# Patient Record
Sex: MALE | Race: WHITE | Employment: OTHER | URBAN - METROPOLITAN AREA
[De-identification: names, ages, dates, MRNs, and addresses within clinical notes are randomized per-mention and may not be internally consistent; named-entity substitution may affect disease eponyms.]

---

## 2018-01-10 ENCOUNTER — HOSPITAL ENCOUNTER (OUTPATIENT)
Dept: NEUROLOGY | Age: 73
Discharge: HOME | End: 2018-01-10
Attending: PSYCHIATRY & NEUROLOGY
Payer: MEDICARE

## 2018-01-10 DIAGNOSIS — H81.392 VESTIBULAR VERTIGO, LEFT: ICD-10-CM

## 2018-01-10 PROCEDURE — 92585 HC AUDITORY EVOKE POTENT COMPR: CPT

## 2018-01-10 NOTE — PROCEDURES
[address] [address]  MR#: [id number]  : 1945  ACCOUNT #: [de-identified]   DATE OF SERVICE: 01/10/2018    ELECTROENCEPHALOGRAM NUMBER:  [id number]. CLINICAL INFORMATION:  Vestibular vertigo on the left. DESCRIPTION:  Well-formed reproducible waves were obtained to click stimulation using 90 dB ipsilateral stimulation with 60 dB contralateral masking. With right ear stimulation, wave 3 was at 3.8 milliseconds and wave 5 was 5.9 milliseconds. With left ear stimulation, wave 3 was at 3.7 milliseconds and wave 5 was 5.7 milliseconds. The left/right difference in the 3/5 interval was 0.1 milliseconds, normal.    IMPRESSION:  Although wave 1 was absent bilaterally, wave 3 and wave 5 clearly are present and are normal.  These are normal and symmetric brainstem auditory evoked responses bilaterally.       Mary Love MD       [facility] / Mati Hutchins  D: 01/10/2018 17:12     T: 01/10/2018 18:50  JOB #: [id number]

## 2018-07-27 PROBLEM — L03.116 LEFT LEG CELLULITIS: Status: ACTIVE | Noted: 2018-07-27

## 2018-12-12 PROBLEM — R07.9 ACUTE CHEST PAIN: Status: ACTIVE | Noted: 2018-12-12

## 2019-03-25 ENCOUNTER — ANESTHESIA EVENT (OUTPATIENT)
Dept: SURGERY | Age: 74
End: 2019-03-25
Payer: MEDICARE

## 2019-03-25 RX ORDER — LISINOPRIL 20 MG/1
20 TABLET ORAL
COMMUNITY
End: 2021-10-29

## 2019-03-25 NOTE — PERIOP NOTES
PAT - SURGICAL PRE-ADMISSION INSTRUCTIONS    NAME:  Roc Aguilar                                                          TODAY'S DATE:  3/25/2019    SURGERY DATE:  3/26/2019                                  SURGERY ARRIVAL TIME:   TBV    1. Do NOT eat or drink anything, including candy or gum, after MIDNIGHT on 03/25/2019 , unless you have specific instructions from your Surgeon or Anesthesia Provider to do so. 2. No smoking on the day of surgery. 3. No alcohol 24 hours prior to the day of surgery. 4. No recreational drugs for one week prior to the day of surgery. 5. Leave all valuables, including money/purse, at home. 6. Remove all jewelry, nail polish, makeup (including mascara); no lotions, powders, deodorant, or perfume/cologne/after shave. 7. Glasses/Contact lenses and Dentures may be worn to the hospital.  They will be removed prior to surgery. 8. Call your doctor if symptoms of a cold or illness develop within 24 ours prior to surgery. 9. AN ADULT MUST DRIVE YOU HOME AFTER OUTPATIENT SURGERY. 10. If you are having an OUTPATIENT procedure, please make arrangements for a responsible adult to be with you for 24 hours after your surgery. 11. If you are admitted to the hospital, you will be assigned to a bed after surgery is complete. Normally a family member will not be able to see you until you are in your assigned bed. 15. Family is encouraged to accompany you to the hospital.  We ask visitors in the treatment area to be limited to ONE person at a time to ensure patient privacy. EXCEPTIONS WILL BE MADE AS NEEDED. 15. Children under 12 are discouraged from entering the treatment area and need to be supervised by an adult when in the waiting room. Special Instructions:    Bring inhaler. , Take these medications the morning of surgery with a sip of water:  Sotal, STOP anticoagulants AT LEAST 1 WEEK PRIOR to your surgery or, follow other MD instructions:  Stopped Aspirin and Xarelto on 03/21/2019    Patient Prep:    shower with anti-bacterial soap    These surgical instructions were reviewed with Rui Tompkins during the PAT phone call. Directions: On the morning of surgery, please go to the [facility]. Enter the building from the [facility] entrance, 1st floor (next to the Emergency Room entrance). Take the elevator to the 2nd floor. Sign in at the Registration Desk.     If you have any questions and/or concerns, please do not hesitate to call:  (Prior to the day of surgery)  [facility] unit:  [phone]  (Day of surgery)  [facility] unit:  [phone]

## 2019-03-26 ENCOUNTER — APPOINTMENT (OUTPATIENT)
Dept: GENERAL RADIOLOGY | Age: 74
End: 2019-03-26
Attending: UROLOGY
Payer: MEDICARE

## 2019-03-26 ENCOUNTER — HOSPITAL ENCOUNTER (OUTPATIENT)
Age: 74
Setting detail: OUTPATIENT SURGERY
Discharge: HOME | End: 2019-03-26
Attending: UROLOGY | Admitting: UROLOGY
Payer: MEDICARE

## 2019-03-26 ENCOUNTER — ANESTHESIA (OUTPATIENT)
Dept: SURGERY | Age: 74
End: 2019-03-26
Payer: MEDICARE

## 2019-03-26 VITALS
RESPIRATION RATE: 18 BRPM | BODY MASS INDEX: 38.24 KG/M2 | HEIGHT: 74 IN | OXYGEN SATURATION: 98 % | TEMPERATURE: 97.6 F | SYSTOLIC BLOOD PRESSURE: 138 MMHG | DIASTOLIC BLOOD PRESSURE: 71 MMHG | HEART RATE: 61 BPM | WEIGHT: 298 LBS

## 2019-03-26 DIAGNOSIS — C67.2 MALIGNANT NEOPLASM OF LATERAL WALL OF URINARY BLADDER (HCC): ICD-10-CM

## 2019-03-26 DIAGNOSIS — G89.18 POST-OP PAIN: Primary | ICD-10-CM

## 2019-03-26 PROCEDURE — 77030019927 HC TBNG IRR CYSTO BAXT -A: Performed by: UROLOGY

## 2019-03-26 PROCEDURE — 74011250637 HC RX REV CODE- 250/637: Performed by: NURSE ANESTHETIST, CERTIFIED REGISTERED

## 2019-03-26 PROCEDURE — 74011250636 HC RX REV CODE- 250/636: Performed by: UROLOGY

## 2019-03-26 PROCEDURE — 77030028098 HC ELECTRD LP BPLR OCOA -F: Performed by: UROLOGY

## 2019-03-26 PROCEDURE — 77030021678 HC GLIDESCP STAT DISP VERT -B: Performed by: ANESTHESIOLOGY

## 2019-03-26 PROCEDURE — 76210000026 HC REC RM PH II 1 TO 1.5 HR: Performed by: UROLOGY

## 2019-03-26 PROCEDURE — 88342 IMHCHEM/IMCYTCHM 1ST ANTB: CPT

## 2019-03-26 PROCEDURE — 74420 UROGRAPHY RTRGR +-KUB: CPT

## 2019-03-26 PROCEDURE — 77030013079 HC BLNKT BAIR HGGR 3M -A: Performed by: ANESTHESIOLOGY

## 2019-03-26 PROCEDURE — 88307 TISSUE EXAM BY PATHOLOGIST: CPT

## 2019-03-26 PROCEDURE — 77030032490 HC SLV COMPR SCD KNE COVD -B: Performed by: UROLOGY

## 2019-03-26 PROCEDURE — 74011000250 HC RX REV CODE- 250

## 2019-03-26 PROCEDURE — 76210000000 HC OR PH I REC 2 TO 2.5 HR: Performed by: UROLOGY

## 2019-03-26 PROCEDURE — 76010000149 HC OR TIME 1 TO 1.5 HR: Performed by: UROLOGY

## 2019-03-26 PROCEDURE — 74011250636 HC RX REV CODE- 250/636

## 2019-03-26 PROCEDURE — 77030018836 HC SOL IRR NACL ICUM -A: Performed by: UROLOGY

## 2019-03-26 PROCEDURE — 77030008683 HC TU ET CUF COVD -A: Performed by: ANESTHESIOLOGY

## 2019-03-26 PROCEDURE — 77030018846 HC SOL IRR STRL H20 ICUM -A: Performed by: UROLOGY

## 2019-03-26 PROCEDURE — 74011250636 HC RX REV CODE- 250/636: Performed by: NURSE ANESTHETIST, CERTIFIED REGISTERED

## 2019-03-26 PROCEDURE — 74011636320 HC RX REV CODE- 636/320: Performed by: UROLOGY

## 2019-03-26 PROCEDURE — 76060000033 HC ANESTHESIA 1 TO 1.5 HR: Performed by: UROLOGY

## 2019-03-26 RX ORDER — LIDOCAINE HYDROCHLORIDE 10 MG/ML
0.1 INJECTION, SOLUTION EPIDURAL; INFILTRATION; INTRACAUDAL; PERINEURAL AS NEEDED
Status: DISCONTINUED | OUTPATIENT
Start: 2019-03-26 | End: 2019-03-26 | Stop reason: HOSPADM

## 2019-03-26 RX ORDER — LIDOCAINE HYDROCHLORIDE 20 MG/ML
INJECTION, SOLUTION EPIDURAL; INFILTRATION; INTRACAUDAL; PERINEURAL AS NEEDED
Status: DISCONTINUED | OUTPATIENT
Start: 2019-03-26 | End: 2019-03-26 | Stop reason: HOSPADM

## 2019-03-26 RX ORDER — SODIUM CHLORIDE 0.9 % (FLUSH) 0.9 %
5-40 SYRINGE (ML) INJECTION AS NEEDED
Status: DISCONTINUED | OUTPATIENT
Start: 2019-03-26 | End: 2019-03-26 | Stop reason: HOSPADM

## 2019-03-26 RX ORDER — SODIUM CHLORIDE, SODIUM LACTATE, POTASSIUM CHLORIDE, CALCIUM CHLORIDE 600; 310; 30; 20 MG/100ML; MG/100ML; MG/100ML; MG/100ML
75 INJECTION, SOLUTION INTRAVENOUS CONTINUOUS
Status: DISPENSED | OUTPATIENT
Start: 2019-03-26 | End: 2019-03-26

## 2019-03-26 RX ORDER — NALOXONE HYDROCHLORIDE 0.4 MG/ML
0.1 INJECTION, SOLUTION INTRAMUSCULAR; INTRAVENOUS; SUBCUTANEOUS AS NEEDED
Status: DISCONTINUED | OUTPATIENT
Start: 2019-03-26 | End: 2019-03-26 | Stop reason: HOSPADM

## 2019-03-26 RX ORDER — OXYCODONE HYDROCHLORIDE 5 MG/1
5 TABLET ORAL
Status: COMPLETED | OUTPATIENT
Start: 2019-03-26 | End: 2019-03-26

## 2019-03-26 RX ORDER — FENTANYL CITRATE 50 UG/ML
50 INJECTION, SOLUTION INTRAMUSCULAR; INTRAVENOUS
Status: DISCONTINUED | OUTPATIENT
Start: 2019-03-26 | End: 2019-03-26 | Stop reason: HOSPADM

## 2019-03-26 RX ORDER — SODIUM CHLORIDE 0.9 % (FLUSH) 0.9 %
5-40 SYRINGE (ML) INJECTION EVERY 8 HOURS
Status: DISCONTINUED | OUTPATIENT
Start: 2019-03-26 | End: 2019-03-26 | Stop reason: HOSPADM

## 2019-03-26 RX ORDER — PROPOFOL 10 MG/ML
INJECTION, EMULSION INTRAVENOUS AS NEEDED
Status: DISCONTINUED | OUTPATIENT
Start: 2019-03-26 | End: 2019-03-26 | Stop reason: HOSPADM

## 2019-03-26 RX ORDER — FENTANYL CITRATE 50 UG/ML
INJECTION, SOLUTION INTRAMUSCULAR; INTRAVENOUS AS NEEDED
Status: DISCONTINUED | OUTPATIENT
Start: 2019-03-26 | End: 2019-03-26 | Stop reason: HOSPADM

## 2019-03-26 RX ORDER — CEFAZOLIN SODIUM 2 G/50ML
2 SOLUTION INTRAVENOUS
Status: DISCONTINUED | OUTPATIENT
Start: 2019-03-26 | End: 2019-03-26 | Stop reason: DRUGHIGH

## 2019-03-26 RX ORDER — DIPHENHYDRAMINE HYDROCHLORIDE 50 MG/ML
12.5 INJECTION, SOLUTION INTRAMUSCULAR; INTRAVENOUS
Status: DISCONTINUED | OUTPATIENT
Start: 2019-03-26 | End: 2019-03-26 | Stop reason: HOSPADM

## 2019-03-26 RX ORDER — SODIUM CHLORIDE, SODIUM LACTATE, POTASSIUM CHLORIDE, CALCIUM CHLORIDE 600; 310; 30; 20 MG/100ML; MG/100ML; MG/100ML; MG/100ML
25 INJECTION, SOLUTION INTRAVENOUS CONTINUOUS
Status: DISCONTINUED | OUTPATIENT
Start: 2019-03-26 | End: 2019-03-26 | Stop reason: HOSPADM

## 2019-03-26 RX ORDER — GLYCOPYRROLATE 0.2 MG/ML
INJECTION INTRAMUSCULAR; INTRAVENOUS AS NEEDED
Status: DISCONTINUED | OUTPATIENT
Start: 2019-03-26 | End: 2019-03-26 | Stop reason: HOSPADM

## 2019-03-26 RX ORDER — FAMOTIDINE 20 MG/1
20 TABLET, FILM COATED ORAL ONCE
Status: COMPLETED | OUTPATIENT
Start: 2019-03-26 | End: 2019-03-26

## 2019-03-26 RX ORDER — SUCCINYLCHOLINE CHLORIDE 20 MG/ML
INJECTION INTRAMUSCULAR; INTRAVENOUS AS NEEDED
Status: DISCONTINUED | OUTPATIENT
Start: 2019-03-26 | End: 2019-03-26 | Stop reason: HOSPADM

## 2019-03-26 RX ORDER — FAMOTIDINE 20 MG/1
TABLET, FILM COATED ORAL
Status: DISCONTINUED
Start: 2019-03-26 | End: 2019-03-26 | Stop reason: HOSPADM

## 2019-03-26 RX ORDER — VECURONIUM BROMIDE FOR INJECTION 1 MG/ML
INJECTION, POWDER, LYOPHILIZED, FOR SOLUTION INTRAVENOUS AS NEEDED
Status: DISCONTINUED | OUTPATIENT
Start: 2019-03-26 | End: 2019-03-26 | Stop reason: HOSPADM

## 2019-03-26 RX ORDER — ONDANSETRON 2 MG/ML
INJECTION INTRAMUSCULAR; INTRAVENOUS AS NEEDED
Status: DISCONTINUED | OUTPATIENT
Start: 2019-03-26 | End: 2019-03-26 | Stop reason: HOSPADM

## 2019-03-26 RX ORDER — OXYCODONE AND ACETAMINOPHEN 5; 325 MG/1; MG/1
1 TABLET ORAL
Qty: 15 TAB | Refills: 0 | Status: SHIPPED | OUTPATIENT
Start: 2019-03-26 | End: 2019-03-29

## 2019-03-26 RX ORDER — NEOSTIGMINE METHYLSULFATE 5 MG/5 ML
SYRINGE (ML) INTRAVENOUS AS NEEDED
Status: DISCONTINUED | OUTPATIENT
Start: 2019-03-26 | End: 2019-03-26 | Stop reason: HOSPADM

## 2019-03-26 RX ORDER — OXYBUTYNIN CHLORIDE 5 MG/1
5 TABLET ORAL
Qty: 9 TAB | Refills: 0 | Status: SHIPPED | OUTPATIENT
Start: 2019-03-26 | End: 2019-03-26 | Stop reason: SDUPTHER

## 2019-03-26 RX ADMIN — VECURONIUM BROMIDE FOR INJECTION 5 MG: 1 INJECTION, POWDER, LYOPHILIZED, FOR SOLUTION INTRAVENOUS at 10:52

## 2019-03-26 RX ADMIN — ONDANSETRON 4 MG: 2 INJECTION INTRAMUSCULAR; INTRAVENOUS at 11:29

## 2019-03-26 RX ADMIN — FENTANYL CITRATE 50 MCG: 50 INJECTION, SOLUTION INTRAMUSCULAR; INTRAVENOUS at 10:29

## 2019-03-26 RX ADMIN — SUCCINYLCHOLINE CHLORIDE 140 MG: 20 INJECTION INTRAMUSCULAR; INTRAVENOUS at 10:40

## 2019-03-26 RX ADMIN — GLYCOPYRROLATE 0.6 MG: 0.2 INJECTION INTRAMUSCULAR; INTRAVENOUS at 11:26

## 2019-03-26 RX ADMIN — CEFAZOLIN 3 G: 1 INJECTION, POWDER, FOR SOLUTION INTRAMUSCULAR; INTRAVENOUS; PARENTERAL at 10:45

## 2019-03-26 RX ADMIN — FAMOTIDINE 20 MG: 20 TABLET ORAL at 09:28

## 2019-03-26 RX ADMIN — PROPOFOL 200 MG: 10 INJECTION, EMULSION INTRAVENOUS at 10:40

## 2019-03-26 RX ADMIN — SODIUM CHLORIDE, SODIUM LACTATE, POTASSIUM CHLORIDE, AND CALCIUM CHLORIDE 25 ML/HR: 600; 310; 30; 20 INJECTION, SOLUTION INTRAVENOUS at 09:43

## 2019-03-26 RX ADMIN — LIDOCAINE HYDROCHLORIDE 100 MG: 20 INJECTION, SOLUTION EPIDURAL; INFILTRATION; INTRACAUDAL; PERINEURAL at 10:40

## 2019-03-26 RX ADMIN — FENTANYL CITRATE 50 MCG: 50 INJECTION, SOLUTION INTRAMUSCULAR; INTRAVENOUS at 10:36

## 2019-03-26 RX ADMIN — Medication 4 MG: at 11:26

## 2019-03-26 RX ADMIN — OXYCODONE HYDROCHLORIDE 5 MG: 5 TABLET ORAL at 14:08

## 2019-03-26 NOTE — DISCHARGE INSTRUCTIONS
Patient Education        Cystoscopy: What to Expect at [address]    A cystoscopy is a procedure that lets a doctor look inside of the bladder and the urethra. The urethra is the tube that carries urine from the bladder to outside the body. The doctor uses a thin, lighted tool called a cystoscope. Your bladder is filled with fluid. This stretches the bladder so that your doctor can look closely at the inside of your bladder. After the cystoscopy, your urethra may be sore at first, and it may burn when you urinate for the first few days after the procedure. You may feel the need to urinate more often, and your urine may be pink. These symptoms should get better in 1 or 2 days. You will probably be able to go back to most of your usual activities in 1 or 2 days. This care sheet gives you a general idea about how long it will take for you to recover. But each person recovers at a different pace. Follow the steps below to get better as quickly as possible. How can you care for yourself at home? Activity    · Rest when you feel tired. Getting enough sleep will help you recover.     · Try to walk each day. Start by walking a little more than you did the day before. Bit by bit, increase the amount you walk. Walking boosts blood flow and helps prevent pneumonia and constipation.     · Avoid strenuous activities, such as bicycle riding, jogging, weight lifting, or aerobic exercise, until your doctor says it is okay.     · Ask your doctor when you can drive again.     · Most people are able to return to work within 1 or 2 days after the procedure.     · You may shower and take baths as usual.     · Ask your doctor when it is okay for you to have sex. Diet    · You can eat your normal diet. If your stomach is upset, try bland, low-fat foods like plain rice, broiled chicken, toast, and yogurt.     · Drink plenty of fluids (unless your doctor tells you not to).    Medicines    · Take pain medicines exactly as directed. ? If the doctor gave you a prescription medicine for pain, take it as prescribed. ? If you are not taking a prescription pain medicine, ask your doctor if you can take an over-the-counter medicine.     · If you think your pain medicine is making you sick to your stomach:  ? Take your medicine after meals (unless your doctor has told you not to). ? Ask your doctor for a different pain medicine.     · If your doctor prescribed antibiotics, take them as directed. Do not stop taking them just because you feel better. You need to take the full course of antibiotics. Follow-up care is a key part of your treatment and safety. Be sure to make and go to all appointments, and call your doctor if you are having problems. It's also a good idea to know your test results and keep a list of the medicines you take. When should you call for help? Call 911 anytime you think you may need emergency care. For example, call if:    · You passed out (lost consciousness).     · You have severe trouble breathing.     · You have sudden chest pain and shortness of breath, or you cough up blood.     · You have severe belly pain.    Call your doctor now or seek immediate medical care if:    · You are sick to your stomach or cannot keep fluids down.     · Your urine is still red or you see blood clots after you have urinated several times.     · You have trouble passing urine or stool, especially if you have pain or swelling in your lower belly.     · You have signs of a blood clot, such as:  ? Pain in your calf, back of the knee, thigh, or groin. ? Redness and swelling in your leg or groin.     · You develop a fever or severe chills.     · You have pain in your back just below your rib cage. This is called flank pain.    Watch closely for changes in your health, and be sure to contact your doctor if:    · You have pain or burning when you urinate.  A burning feeling is normal for a day or two after the test, but call if it does not get better.     · You have a frequent urge to urinate but can pass only small amounts of urine.     · Your urine is pink, red, or cloudy, or smells bad. It is normal for the urine to have a pinkish color for a few days after the test, but call if it does not get better. Where can you learn more? Go to http://nancy-francisca.info/. Enter Z243 in the search box to learn more about \"Cystoscopy: What to Expect at Home. \"  Current as of: March 20, 2018  Content Version: 11.9  © 5718-1567 [employer]. Care instructions adapted under license by [employer] (which disclaims liability or warranty for this information). If you have questions about a medical condition or this instruction, always ask your healthcare professional. [address] any warranty or liability for your use of this information. DISCHARGE SUMMARY from Nurse    PATIENT INSTRUCTIONS:    After general anesthesia or intravenous sedation, for 24 hours or while taking prescription Narcotics:  · Limit your activities  · Do not drive and operate hazardous machinery  · Do not make important personal or business decisions  · Do  not drink alcoholic beverages  · If you have not urinated within 8 hours after discharge, please contact your surgeon on call.     Report the following to your surgeon:  · Excessive pain, swelling, redness or odor of or around the surgical area  · Temperature over 100.5  · Nausea and vomiting lasting longer than 4 hours or if unable to take medications  · Any signs of decreased circulation or nerve impairment to extremity: change in color, persistent  numbness, tingling, coldness or increase pain  · Any questions    What to do at Home:  Recommended activity: Activity as tolerated and no driving for today      These are general instructions for a healthy lifestyle:    No smoking/ No tobacco products/ Avoid exposure to second hand smoke  Surgeon General's Warning: Quitting smoking now greatly reduces serious risk to your health. Obesity, smoking, and sedentary lifestyle greatly increases your risk for illness    A healthy diet, regular physical exercise & weight monitoring are important for maintaining a healthy lifestyle    You may be retaining fluid if you have a history of heart failure or if you experience any of the following symptoms:  Weight gain of 3 pounds or more overnight or 5 pounds in a week, increased swelling in our hands or feet or shortness of breath while lying flat in bed. Please call your doctor as soon as you notice any of these symptoms; do not wait until your next office visit. Recognize signs and symptoms of STROKE:    F-face looks uneven    A-arms unable to move or move unevenly    S-speech slurred or non-existent    T-time-call 911 as soon as signs and symptoms begin-DO NOT go       Back to bed or wait to see if you get better-TIME IS BRAIN. Warning Signs of HEART ATTACK     Call 911 if you have these symptoms:   Chest discomfort. Most heart attacks involve discomfort in the center of the chest that lasts more than a few minutes, or that goes away and comes back. It can feel like uncomfortable pressure, squeezing, fullness, or pain.  Discomfort in other areas of the upper body. Symptoms can include pain or discomfort in one or both arms, the back, neck, jaw, or stomach.  Shortness of breath with or without chest discomfort.  Other signs may include breaking out in a cold sweat, nausea, or lightheadedness. Don't wait more than five minutes to call 911 - MINUTES MATTER! Fast action can save your life. Calling 911 is almost always the fastest way to get lifesaving treatment. Emergency Medical Services staff can begin treatment when they arrive -- up to an hour sooner than if someone gets to the hospital by car. The discharge information has been reviewed with the patient. The patient verbalized understanding.   Discharge medications reviewed with the patient and appropriate educational materials and side effects teaching were provided. _Patient armband removed and given to patient to take home. Patient was informed of the privacy risks if armband lost or stolen.   __________________________________________________________________________________________________________________________________

## 2019-03-26 NOTE — H&P
ASSESSMENT:   1. Bladder mass    2. Nocturia       1. 1 cm soft tissue mass at posterior right bladder on PET scan 12/24/18.              -Cysto today 2 cm mass on right lateral bladder wall and multiple <5 mm lesions above 2 cm mass  2. pT1mi N0M0 lepidic adenocarcinoma of the lung. S/p RU lobectomy on 1/22/2019.           PLAN:     Cysto today 2 cm mass on right lateral bladder wall and multiple <5 mm lesions above 2 cm mass  PET scan images and report reviewed  Urine sent for cytology and culture  Discussed proceeding with cysto, TURBT, B RPG, and Gemcitabine instillation  CTU ordered   Will need cardiac clearance to stop Xarelto/ASA  Will schedule for cysto, TURT, B RPG and gemcitabine instillation     The risks of TURBT were discussed including but not limited to pain, bleeding, infection, heart attack, stroke, blood clots in legs that could propogate to the lungs resulting in pulmonary embolus, and ultimately death. Also discussed risk of bladder injury, perforation, need for catheterization, possible hospitalization or secondary operative procedure. We discussed use of mitomycin and risk of increased bladder irritation. All questions answered to patient's apparent statisfaction.             Chief Complaint   Patient presents with    Other       bladder mass         HISTORY OF PRESENT ILLNESS:  Betty Leroy is a 68 y.o. male who is seen in consultation as referred by Dr. Alfred Garcia MD for bladder mass. Patient was undergoing a work up for lung cancer. Prior to lobectomy, he had a PET scan that revealed a 1 cm soft tissue at right posterior bladder. Cysto today 2 cm mass on right lateral bladder wall and multiple <5 mm lesions above 2 cm mass. Patient reports a episode of gross hematuria last year. Was not worked up for it. Denies recurrent gross hematuria. No other voiding complaints. Denies dysuria, incontinence, and incomplete bladder emptying. Asymptomatic for infection.  Denies abdominal or back pain.      Patient underwent a right upper lobectomy on 1/22/2019 for lung cancer.        Former smoker for over 48 years  No flowsheet data found.             Past Medical History:   Diagnosis Date    CAD (coronary artery disease)      CH[facility]congestive [facility]) [facility] [address]      [facility]      History of coronary artery stent placement      [facility]      Myocardial infarction [facility]      Obstructive sleep apnea                 Past Surgical History:   Procedure Laterality Date    HX CATARACT REMOVAL Bilateral      HX HERNIA REPAIR        HX ORTHOPAEDIC         Back surgery x3    HX ROTATOR CUFF REPAIR Right           Social History            Tobacco Use    Smoking status: Former Smoker    Smokeless tobacco: Never Used    Tobacco comment: Quit smoking 2 or 3 years ago   Substance Use Topics    Alcohol use: Yes       Alcohol/week: 0.6 oz       Types: 1 Cans of beer per week       Comment: 2 beers per month    Drug use: No         No Known Allergies           Family History   Problem Relation Age of Onset    Cancer Mother           Lung    Heart Attack Father      Cancer Sister           Lung    Arrhythmia Sister                  Current Outpatient Medications   Medication Sig Dispense Refill    aspirin delayed-release 81 mg tablet Take 81 mg by mouth every twelve (12) hours.        folic acid (FOLVITE) 1 mg tablet Take 1 mg by mouth daily.        FLUoxetine (PROZAC) 20 mg capsule Take 20 mg by mouth daily.        simvastatin (ZOCOR) 40 mg tablet Take 40 mg by mouth nightly.        gabapentin (NEURONTIN) 600 mg tablet Take 600 mg by mouth every twelve (12) hours.        furosemide (LASIX) 20 mg tablet Take 20 mg by mouth daily.        rivaroxaban (XARELTO) 20 mg tab tablet Take 20 mg by mouth daily (with dinner).         montelukast (SINGULAIR) 10 mg tablet Take 10 mg by mouth daily.        sotalol (BETAPACE) 120 mg tablet Take 120 mg by mouth every twelve (12) hours.        multivitamin (ONE A DAY) tablet Take 1 Tab by mouth daily.        albuterol (PROVENTIL HFA) 90 mcg/actuation inhaler Take 2 Puffs by inhalation every four (4) hours as needed for Wheezing. Patient provided        methotrexate (RHEUMATREX) 2.5 mg tablet Take 20 mg by mouth every Wednesday.             Review of Systems  Constitutional: Fever: No  Skin: Rash: No  HEENT: Hearing difficulty: No  Eyes: Blurred vision: No  Cardiovascular: Chest pain: No  Respiratory: Shortness of breath: Yes  Gastrointestinal: Nausea/vomiting: No  Musculoskeletal: Back pain: No  Neurological: Weakness: No  Psychological: Memory loss: No  Comments/additional findings:            PHYSICAL EXAMINATION:   Visit Vitals  /82   Ht 6' 2\" (1.88 m)   Wt 300 lb (136.1 kg)   BMI 38.52 kg/m²      Constitutional: WDWN, Pleasant and appropriate affect, No acute distress. CV:  No peripheral swelling noted  Respiratory: No respiratory distress or difficulties  Abdomen:  No abdominal masses or tenderness. No CVA tenderness. No inguinal hernias noted.  Male:    SCROTUM:  No scrotal rash or lesions noticed. Normal bilateral testes and epididymis. PENIS: Urethral meatus normal in location and size. No urethral discharge. Skin: No jaundice. Neuro/Psych:  Alert and oriented x 3, affect appropriate.    Lymphatic:   No enlarged supraclavicular lymph nodes.          REVIEW OF LABS AND IMAGING:          Results for orders placed or performed in visit on 02/22/19   AMB POC URINALYSIS DIP STICK AUTO W/O MICRO   Result Value Ref Range     Color (UA POC)         Clarity (UA POC)         Glucose (UA POC) Negative Negative     Bilirubin (UA POC) Negative Negative     Ketones (UA POC) Negative Negative     Specific gravity (UA POC) 1.020 1.001 - 1.035     Blood (UA POC) Negative Negative     pH (UA POC) 5.0 4.6 - 8.0     Protein (UA POC) Negative Negative     Urobilinogen (UA POC) 0.2 mg/dL 0.2 - 1     Nitrites (UA POC) Negative Negative     Leukocyte esterase (UA POC) Negative Negative      PET scan 12/24/2018: IMPRESSION    1. Anterior segment right upper lobe 2 cm tumor, SUV max 2.3. Post biopsy    2. Nothing specific for regional or distant metastatic disease    3. Additional lung nodules (2) which are nonspecific    -1 cm semisolid subapical right upper lobe (82) SUV max 1.3    -0.5 cm region minor fissure on the right (108) SUV max 1.0    4. Right vocal cord asymmetric mild hypermetabolic activity and potentially minimal thickening. Tumor is possible    -Recommend ENT referral    5. Suggestion of a 1 cm soft tissue mass at the right bladder (250). Concerning for tumor    -Recommend Urology referral    6.  Left kidney 1 cm low-attenuation lesion (182), new compared 2006, is very likely a benign cyst    Incidental findings    -Cardiomegaly suggested    -Small inguinal hernias are suggested    -Likely benign hemangioma left lobe of liver    -Colonic diverticulosis              CC:Chuck Lynch MD        Medical documentation provided with the assistance of Lyndsey Monaco, medical scribe to Valentino Hurst, MD

## 2019-03-26 NOTE — PROCEDURES
OPERATIVE NOTE      Waldemar Feldman  1945  MRN: [id number]    LOCATION OF PATIENT:  [address] [place] [address]    DATE OF OPERATION:  3/26/2019    PREOPERATIVE DIAGNOSIS:  Bladder tumor    POSTOPERATIVE DIAGNOSIS:  same    OPERATION PERFORMED:  1. Cystoscopy  2. Transurethral resection of bladder tumor (medium)  3. bilateral retrograde pyelograms  4. exam under anesthesia  5. Cystogram  6. Fluoroscopy less than 30 minutes with interpretation    SURGEON:  Jose Antonio Mabry MD.    ASSISTANT SURGEON:  none    ANESTHESIA:  General     ESTIMATED BLOOD LOSS:  minimal    IVFs:   Crystalloid per anesthesia records    DRAINS:  20 Fr hassan    SPECIMENS:  Bladder tumor (right lateral wall)    FINDINGS:  1. Number of tumors:   12            2.  Size of largest tumor:           2 cm      3. Characteristics of tumors:      Papillary  yes          Sessile  no              Nodular   no              Flat no    4. Recurrent   no           Primary   brian    5. Suspicious for Carcinoma in situ:    no    6. Clinical tumor stage:    cTa        7. Bimanual exam under anesthesia:        yes     3D mass:   no    8. Visually complete resection:                 yes    9. Visualization of detrusor muscle in resection base:        yes    10. Visual evaluation for perforation:             Yes, negative         (no evidence for perforation)      COMPLICATIONS:  None    CONDITION:  stable    INDICATIONS FOR OPERATION:  The patient was discovered to have an incidental bladder tumor after evaluation for lung cancer. His PET scan and CT Urogram showed no evidence of metastatic disease. Risks and benefits of the procedure were fully discussed with the patient. Alternative treatment options were also discussed pre-operatively. DESCRIPTION OF OPERATION:    The patient was taken to the operating room and a pre-operative time out was performed identifying the correct patient and procedure to be done.  The patient was then induced under general anesthesia. The patient received IV antibiotics prior to the procedure start time and had SCDs cycling prior to the induction. A 21 Senegalese rigid cystoscope was placed into the bladder. Surveillance cysotscopy was performed with a 30 and 70 degree lens. There were 11 small superficial bladder tumors on the posterior wall and one dominant 2 cm tumor on the right lateral wall. Bilateral retrograde pyelogram were performed which showed no filling defects in the upper tracts, no hydronephrosis, and no extravasation of contrast.      The 26 Senegalese resectoscope was then assembled with the super loop. The bladder tumor was fully resected down to detrusor muscle fibers. Resection time was approximately 20 minutes. The resection was fully cauterized for hemostasis. Bladder tumor chips were irrigated with a Mila syringe and sent off the field for a pathologic specimen. No evidence of bladder perforation was noted during the resection, the posterior wall was slightly thin in one area. An exam under anesthesia was done after resecting the mass before the hassan was placed which revealed no fixed or palpable masses. A 20 Senegalese hassan catheter was placed and the balloon was filled with 10 cc of sterile water. A cystogram was performed to ensure no extravasation of contrast prior to deciding to administer intravesical Gemcitabine. The patient was then reversed from the anesthetic without complications and transferred to the recovery room in stable condition. He will receive Gemcitabine in the recovery area and a separate note will be done for this.     He will follow up on Friday for hassan removal.        Jacob Crocker MD  , Dept of Urology  [facility]  [facility] [place] [place]  Pager #: [phone]

## 2019-03-26 NOTE — PROCEDURES
Gemcitabine Instillation     Date of Procedure: 3/26/2019  Patient Name: Jada Calix           Sex: male               MRN: [id number]    YOB: 1945         Age: 68 y.o. Lucien Merida Preoperative diagnosis: Bladder cancer  Postoperative diagnosis: same    Procedure:   1) Gemcitabine bladder instillation    Anesthesia: none    Surgeon: Davey Pradhan MD    Indications: This is a 68 y.o.male who is s/p TURBT today for a bladder mass, and is going to get ally-operative Gemcitabine to lower recurrence risk of bladder cancer. A cystogram at the end of today's procedure was negative for any extravasation. There were no contraindications to proceeding with Gemcitabine treatment.      Procedure: A 20 Guyanese had already been placed in the OR and Gemcitabine 2 grams in 50 mL of normal saline was prepared by the pharmacy and delivered to the recovery area. Once the bladder was drained completely, the Gemcitabine was instilled into the patient's catheter and the catheter was clamped.      Disposition: The bladder will be drained after a 60 minute dwell time, and the patient will go home with the hassan until Friday. The patient has follow up in 2 weeks with Dr. Alejandro Araujo.       Davey Pradhan MD

## 2019-03-26 NOTE — ANESTHESIA PREPROCEDURE EVALUATION
Relevant Problems   No relevant active problems       Anesthetic History   No history of anesthetic complications            Review of Systems / Medical History  Patient summary reviewed and pertinent labs reviewed    Pulmonary    COPD: mild    Sleep apnea: CPAP           Neuro/Psych   Within defined limits           Cardiovascular            Dysrhythmias : atrial fibrillation  CAD         GI/Hepatic/Renal  Within defined limits              Endo/Other        Arthritis     Other Findings              Physical Exam    Airway  Mallampati: II  TM Distance: 4 - 6 cm  Neck ROM: normal range of motion   Mouth opening: Normal     Cardiovascular               Dental  No notable dental hx       Pulmonary                 Abdominal  GI exam deferred       Other Findings            Anesthetic Plan    ASA: 3  Anesthesia type: general          Induction: Intravenous  Anesthetic plan and risks discussed with: Patient

## 2019-03-26 NOTE — PERIOP NOTES
Pre-Op Summary    Pt arrived via car with family/friend and is oriented to time, place, person and situation. Patient with steady gait with none assistive devices. Visit Vitals  /79 (BP 1 Location: Right arm, BP Patient Position: At rest)   Pulse 66   Temp 98.6 °F (37 °C)   Resp 16   Ht 6' 2\" (1.88 m)   Wt 135.2 kg (298 lb)   SpO2 97%   BMI 38.26 kg/m²       Peripheral IV located on Left hand . Patients belongings are located with wife. Patient's point of contact is Cliff Montenegro, wife and their contact number is: [phone]. They will be in the waiting room. They are able to receive medication information. They will be their ride home.

## 2019-03-26 NOTE — INTERVAL H&P NOTE
H&P Update:  Marci Russell was seen and examined. History and physical has been reviewed. The patient has been examined. There have been no significant clinical changes since the completion of the originally dated History and Physical.  Cleared by cardiology. Held Xarelto since Thursday. Stopped ASA as well. He has had issues extubating in the past with longer surgeries, we discussed that the risk of this would be low but given tumor location described on cystoscopy, intubation with paralytic would be ideal and safer for the patient. All questions answered. Ancef on call, pre-op Cx NG.       Signed By: Radha Torres MD     March 26, 2019 9:56 AM

## 2019-03-26 NOTE — PERIOP NOTES
1140: Received PT from OR via stretcher, slightly drowsy, O2 with mask at 9L. Parsons intact. Pt in no acute distress. 1320: report given to [facility] in phase 2. Pt awake, alert and oriented.  Pt in no acute distress

## 2019-03-26 NOTE — PERIOP NOTES
Phase 2 Recovery Summary  Patient arrived to Phase 2 at 1437  Report received from [facility], RN  Vitals:    03/26/19 1247 03/26/19 1302 03/26/19 1317 03/26/19 1347   BP: 136/68 141/63 141/75 138/71   Pulse: 60 60 60 61   Resp: 11 10 19 18   Temp:   97.6 °F (36.4 °C)    SpO2: 99% 98% 98% 98%   Weight:       Height:           oriented to time, place, person and situation    Lines and Drains  Peripheral Intravenous Line:   Peripheral IV 03/26/19 Right Hand (Active)   Site Assessment Clean, dry, & intact 3/26/2019  1:49 PM   Phlebitis Assessment 0 3/26/2019  1:49 PM   Infiltration Assessment 0 3/26/2019  1:49 PM   Dressing Status Clean, dry, & intact 3/26/2019  1:49 PM   Dressing Type Tape;Transparent 3/26/2019  1:49 PM   Hub Color/Line Status Pink; Infusing 3/26/2019  1:49 PM   Action Taken Open ports on tubing capped 3/26/2019  1:17 PM       Wound  Wound Penis (Active)   Number of days: 0      Noted in chart patient was on C.dif precautions. Patient was treated in July 2018 at [facility] for 1 week. No longer needs to be on contact precautions. Preet Bishop contacted [facility] to have flag removed from chart. Patient arrived to phase 2 from PACU. Alert and oriented x4. No complaints of pain, nausea or dizziness. Vital signs taken. Patient ambulated from bed to chair with minimal assistance. Once patient ambulated, stated pain became a 7/10 and once sat back down and rested went to 5/10. Wife and daughter brought back to recovery room. Given water to sip on and crackers. Once crackers complete, pain pill given. Resting comfortably in chair. IV removed and patient allowed to get dressed. Reviewed discharge instructions. Mother signed for discharge. Patient transported to vehicle via wheelchair.        Patient discharged to home with Wife at [address]

## 2019-03-27 NOTE — ANESTHESIA POSTPROCEDURE EVALUATION
Procedure(s):  CYSTOSCOPY TRANSURETHRAL RESECTION OF BLADDER TUMOR, bilateral retrograde pyelogram gemcitabine instillation. general    Anesthesia Post Evaluation      Multimodal analgesia: multimodal analgesia used between 6 hours prior to anesthesia start to PACU discharge  Patient location during evaluation: bedside  Patient participation: complete - patient participated  Level of consciousness: awake  Pain management: adequate  Airway patency: patent  Anesthetic complications: no  Cardiovascular status: stable  Respiratory status: acceptable  Hydration status: acceptable  Post anesthesia nausea and vomiting:  controlled      Vitals Value Taken Time   /75 3/26/2019  1:17 PM   Temp 36.4 °C (97.6 °F) 3/26/2019  1:17 PM   Pulse 60 3/26/2019  1:32 PM   Resp 11 3/26/2019  1:32 PM   SpO2 99 % 3/26/2019  1:32 PM   Vitals shown include unvalidated device data.

## 2019-04-05 PROBLEM — N39.0 UTI (URINARY TRACT INFECTION): Status: ACTIVE | Noted: 2019-04-05

## 2019-04-05 PROBLEM — N30.90 CYSTITIS: Status: ACTIVE | Noted: 2019-04-05

## 2019-06-28 PROBLEM — Z79.01 CURRENT USE OF LONG TERM ANTICOAGULATION: Status: ACTIVE | Noted: 2018-11-19

## 2019-06-28 PROBLEM — R91.1 PULMONARY NODULE: Status: ACTIVE | Noted: 2018-11-19

## 2019-06-28 PROBLEM — C34.11 MALIGNANT NEOPLASM OF UPPER LOBE OF RIGHT LUNG (HCC): Status: ACTIVE | Noted: 2019-01-29

## 2021-07-28 PROBLEM — Z85.118 H/O: LUNG CANCER: Status: ACTIVE | Noted: 2019-10-25

## 2021-07-28 PROBLEM — R06.02 SOB (SHORTNESS OF BREATH): Status: ACTIVE | Noted: 2021-07-28

## 2021-07-28 PROBLEM — Z95.0 PACEMAKER: Status: ACTIVE | Noted: 2021-07-28

## 2021-07-28 PROBLEM — Z45.018 ENCOUNTER FOR CARE OF PACEMAKER: Status: ACTIVE | Noted: 2021-07-28

## 2021-07-28 PROBLEM — C67.9 BLADDER CANCER (HCC): Status: ACTIVE | Noted: 2019-07-10

## 2021-10-26 PROBLEM — N17.9 ACUTE KIDNEY INJURY (HCC): Status: ACTIVE | Noted: 2021-10-26

## 2021-10-26 PROBLEM — N39.0 ACUTE UTI: Status: ACTIVE | Noted: 2021-10-26

## 2021-10-26 PROBLEM — R53.1 GENERALIZED WEAKNESS: Status: ACTIVE | Noted: 2021-10-26

## 2021-11-02 PROBLEM — J18.9 LEFT UPPER LOBE PNEUMONIA: Status: ACTIVE | Noted: 2021-11-02

## (undated) DEVICE — KENDALL SCD EXPRESS SLEEVES, KNEE LENGTH, MEDIUM: Brand: KENDALL SCD

## (undated) DEVICE — SOLUTION IV 1000ML 0.9% SOD CHL

## (undated) DEVICE — SOLUTION IRRIG 1000ML H2O STRL BLT

## (undated) DEVICE — Y-TYPE TUR/BLADDER IRRIGATION SET, REGULATING CLAMP

## (undated) DEVICE — SEAL INSTR CYSTO ADJ BX PRT SEAL FOR ACC UP TO 6FR

## (undated) DEVICE — SYR IRR CATH TIP LR ADPT 70ML -- CONVERT TO ITEM 363120

## (undated) DEVICE — TRAY PREP DRY W/ PREM GLV 2 APPL 6 SPNG 2 UNDPD 1 OVERWRAP

## (undated) DEVICE — SYRINGE MED 20ML STD CLR PLAS LUERLOCK TIP N CTRL DISP

## (undated) DEVICE — ELECTRODE ES BPLR WIRE DISP SUPERLOOP

## (undated) DEVICE — SPONGE GZ W4XL4IN COT 12 PLY TYP VII WVN C FLD DSGN

## (undated) DEVICE — GOWN,AURORA,FABRIC-REINFORCED,X-LARGE: Brand: MEDLINE

## (undated) DEVICE — Device

## (undated) DEVICE — STERILE POLYISOPRENE POWDER-FREE SURGICAL GLOVES: Brand: PROTEXIS

## (undated) DEVICE — CONTAINER DRN 20L DISP FLUIDRN LLS

## (undated) DEVICE — BAG DRNGE NONSTERILE W/ SUCT HOSE CYSTO/UROLOGICAL FOR GE

## (undated) DEVICE — SOLUTION IRRIG 3000ML 0.9% SOD CHL FLX CONT 0797208] ICU MEDICAL INC]